# Patient Record
Sex: FEMALE | Race: BLACK OR AFRICAN AMERICAN | URBAN - METROPOLITAN AREA
[De-identification: names, ages, dates, MRNs, and addresses within clinical notes are randomized per-mention and may not be internally consistent; named-entity substitution may affect disease eponyms.]

---

## 2019-12-03 ENCOUNTER — OFFICE VISIT (OUTPATIENT)
Dept: SURGERY | Age: 44
End: 2019-12-03

## 2019-12-03 VITALS
BODY MASS INDEX: 47.09 KG/M2 | HEART RATE: 78 BPM | HEIGHT: 66 IN | DIASTOLIC BLOOD PRESSURE: 39 MMHG | WEIGHT: 293 LBS | OXYGEN SATURATION: 98 % | TEMPERATURE: 97.3 F | SYSTOLIC BLOOD PRESSURE: 118 MMHG

## 2019-12-03 DIAGNOSIS — Z12.31 SCREENING MAMMOGRAM, ENCOUNTER FOR: ICD-10-CM

## 2019-12-03 DIAGNOSIS — E66.01 MORBID OBESITY (HCC): Primary | ICD-10-CM

## 2019-12-03 DIAGNOSIS — G47.33 OBSTRUCTIVE SLEEP APNEA (ADULT) (PEDIATRIC): ICD-10-CM

## 2019-12-03 DIAGNOSIS — I10 ESSENTIAL HYPERTENSION, BENIGN: ICD-10-CM

## 2019-12-03 DIAGNOSIS — E66.01 OBESITY, MORBID (HCC): ICD-10-CM

## 2019-12-03 RX ORDER — LISINOPRIL AND HYDROCHLOROTHIAZIDE 20; 25 MG/1; MG/1
TABLET ORAL DAILY
COMMUNITY

## 2019-12-03 RX ORDER — SPIRONOLACTONE 25 MG/1
TABLET ORAL DAILY
COMMUNITY

## 2019-12-03 NOTE — PATIENT INSTRUCTIONS
If you have any questions or concerns about today's appointment, the verbal and/or written instructions you were given for follow up care, please call our office at [phone].     [facility] - [address]    [phone] office  [phone]

## 2019-12-03 NOTE — PROGRESS NOTES
tial Consultation for Bariatric Surgery Template     Nona Vernon is a 41-year-old black female who presents for surgical opinion regard to definitive management of her super, super obesity. Onset obesity: Childhood. Weighted age 23: 300 pounds on a 5 feet 6 inches frame. Maximum weight: 425 pounds on a 5 feet 6 inches frame with body mass index of 69/current weight  Pattern/progression of weight gain: Slowly progressive interrupted by dietary weight loss followed by regain lost weight as well as additional weight thus exhibiting the yo-yo effect after current maximum weight of 425 pounds. Maximum medical weight loss attempts: Multiple unsupervised and supervised weight loss trials with a maximal loss occurring 2009 losing 80 pounds over 8 months. Comorbidities: Hypertension, clinical obstructive sleep apnea, weight related arthropathy-knees. Current weight: 425. BMI: 69.  Ideal body weight: 137. Excess body weight: 288. Estimated postsurgical weight loss: 230. Estimated postsurgical goal weight: 195. Allergies: No known drug allergies. Current medications see medication list.  Past medical history:  1. Super, super obesity with a body mass index of 69 and obesity related comorbidities of hypertension, clinical obstructive sleep apnea, weight related arthropathy-knees. 2.  History of iron deficiency anemia. Past surgical history:  1. Bilateral tubal ligation   2. Right foot fifth 2 osteotomy   3. Laparoscopic cholecystectomy 2017. Social history:  Denies utilization of both tobacco and alcohol. Family history: Mother  72- hypertension, dialysis dependent renal failure, sepsis secondary to mesenteric ischemia  Father  in 70s-unknown to patient.   Brother 49-clinically severely obese, hypertension, congestive heart failure  Brother 46- clinically severe obese, congestive heart failure, adult-onset diabetes mellitus  Sister 50-healthy    No Known Allergies    Current Outpatient Medications   Medication Sig Dispense Refill    lisinopril-hydroCHLOROthiazide (PRINZIDE, ZESTORETIC) 20-25 mg per tablet Take  by mouth daily.  spironolactone (ALDACTONE) 25 mg tablet Take  by mouth daily.  ferrous sulfate (IRON PO) Take  by mouth. 325mg         Past Medical History:   Diagnosis Date    Anemia     Hypertension     Morbid obesity ([address]        Past Surgical History:   Procedure Laterality Date    HX CHOLECYSTECTOMY      HX ORTHOPAEDIC      Toe surgery right foot (pinky toe)     HX TUBAL LIGATION         Social History     Tobacco Use    Smoking status: Former Smoker    Smokeless tobacco: Never Used    Tobacco comment: Patient stated that she smoked about 6 mts at young age    Substance Use Topics    Alcohol use: Not Currently     Frequency: Never    Drug use: Never       Family History   Problem Relation Age of Onset    Kidney Disease Mother     Hypertension Mother     Heart Disease Mother     Cancer Father     No Known Problems Sister     Heart Failure Brother     Hypertension Brother     Hypertension Sister     Diabetes Sister        Family Status   Relation Name Status    Mother     [de-identified] Father      Sister  Alive    Brother  [de-identified]    Sister  Alive       Review of Systems:      General: Denies fevers, chills, night sweats, fatigue, weight loss, or weight gain.     HEENT: Denies changes in auditory or visual acuity, recurrent pharyngitis, epistaxis, chronic rhinorrhea, vertigo    Respiratory: Denies increasing shortness of breath, productive cough, hemoptysis    Cardiac: Denies known history of cardiac disease, heart murmur, palpitations    GI: Denies dysphagia, recurrent emesis, hematemesis, changes in bowel habits, hematochezia, melena    : Denies hematuria frequency urgency dysuria    Musculoskeletal: Denies fractures, dislocations    Neurologic: Denies history of CVA, paralysis paresthesias, recurrent cephalgia, seizures    Endocrine: Denies polyuria, polydipsia, polyphagia, heat and cold intolerance    Lymph/heme: Denies a history of malignancy, anemia, bruising, blood transfusions    Integumentary: Negative for dermatitis         Physical Exam    Visit Vitals  BP (!) 118/39 (BP 1 Location: Right arm, BP Patient Position: Sitting)   Pulse 78   Temp 97.3 °F (36.3 °C) (Oral)   Ht 5' 6\" (1.676 m)   Wt (!) 192.6 kg (424 lb 9.6 oz)   LMP 11/28/2019   SpO2 98%   BMI 68.53 kg/m²       Pre op weight: 424.6  EBW: 287.6  Wt loss to date: 0     General: Clinically severely obese in no acute distress, nontoxic in appearance. Head: Normocephalic, atraumatic  Mouth: Clear, no overt lesions, oral mucosa is pink and moist.  Neck: Supple, no masses, no adenopathy or carotid bruits, trachea midline  Resp: Clear to auscultation bilaterally, no wheezing, rhonchi, or rales, excursions normal and symmetrical.  Cardio: Regular rate and rhythm, no murmurs, clicks, gallops, or rubs. Abdomen: Obese, soft, nontender, nondistended, normoactive bowel sounds, no hernias. Extremities: Warm, well perfused, no tenderness or swelling, normal gait/station, without edema or varicosities  Neuro: Sensation and strength grossly intact and symmetrical.  Psych: Alert and oriented to person, place, and time. Impression:       78-year-old black female with a body mass index of 69 obesity related comorbidities of hypertension, clinical obstructive sleep apnea, and weight related arthropathy-knees who would benefit from bariatric surgery. We have had an extensive discussion with regard to the risks, benefits and likely outcomes of the operation. We've discussed the restrictive and malabsorptive nature of the gastric bypass and compared and contrasted with the sleeve gastrectomy. The patient understands the likelihood of losing approximately 80% of their excess weight in 12 to 18 months.   The patient also understands the risks including but not limited to bleeding, infection, need for reoperation, ulcers, leaks and strictures, bowel obstruction secondary to adhesions and internal hernias, DVT, PE, heart attack, stroke, and death. Patient also understands risks of inadequate weight loss, excess weight loss, vitamin insufficiency, protein malnutrition, excess skin, and loss of hair. We have reviewed the components of a successful postoperative course including requirement for a high protein, low carbohydrate diet, 60 oz a day of zero calorie liquids, daily vitamin supplementation, daily exercise, regular follow-up, and participation in support groups.  At this time we will enroll the patient in our bariatric program, undertake routine laboratory evaluation, chest X-ray, EKG, possible UGI and evaluation by  nutritionist as well as psychologist and pending their satisfactory completion of the preop evaluation, plan to pursue laparoscopic potentially open gastric bypass to achieve definitive durable weight loss on a personal level with expected resolution of obesity related comorbidities

## 2019-12-03 NOTE — PROGRESS NOTES
Caleb Obrien is a 53 y.o. female (: 1975) presenting to address:    Chief Complaint   Patient presents with    Weight Management     GBP consult        Medication list and allergies have been reviewed with Caleb Obrien and updated as of today's date. I have gone over all Medical, Surgical and Social History with Caleb Obrien and updated/added the information accordingly.

## 2020-01-29 ENCOUNTER — DOCUMENTATION ONLY (OUTPATIENT)
Dept: SURGERY | Age: 45
End: 2020-01-29

## 2020-01-29 NOTE — PROGRESS NOTES
Patient e-mailed RD stating she is no longer able to continue with this program due to it not being covered by her insurance.